# Patient Record
Sex: MALE | Race: WHITE
[De-identification: names, ages, dates, MRNs, and addresses within clinical notes are randomized per-mention and may not be internally consistent; named-entity substitution may affect disease eponyms.]

---

## 2017-04-11 ENCOUNTER — HOSPITAL ENCOUNTER (OUTPATIENT)
Age: 82
Discharge: ANOTHER HOSPITAL | End: 2017-04-11
Payer: MEDICARE

## 2017-04-11 ENCOUNTER — HOSPITAL ENCOUNTER (EMERGENCY)
Age: 82
Discharge: HOME | End: 2017-04-11
Payer: MEDICARE

## 2017-04-11 DIAGNOSIS — G20: ICD-10-CM

## 2017-04-11 DIAGNOSIS — J32.0: ICD-10-CM

## 2017-04-11 DIAGNOSIS — J18.9: Primary | ICD-10-CM

## 2017-04-11 DIAGNOSIS — I10: ICD-10-CM

## 2017-04-11 DIAGNOSIS — R53.1: Primary | ICD-10-CM

## 2017-04-11 DIAGNOSIS — R01.1: ICD-10-CM

## 2017-04-11 DIAGNOSIS — Z86.73: ICD-10-CM

## 2017-04-11 DIAGNOSIS — Z79.82: ICD-10-CM

## 2017-04-11 PROCEDURE — 71020: CPT

## 2017-04-11 PROCEDURE — 99284 EMERGENCY DEPT VISIT MOD MDM: CPT

## 2017-04-11 PROCEDURE — 80053 COMPREHEN METABOLIC PANEL: CPT

## 2017-04-11 PROCEDURE — 36415 COLL VENOUS BLD VENIPUNCTURE: CPT

## 2017-04-11 PROCEDURE — 85025 COMPLETE CBC W/AUTO DIFF WBC: CPT

## 2017-04-11 PROCEDURE — 81003 URINALYSIS AUTO W/O SCOPE: CPT

## 2017-04-11 PROCEDURE — 70450 CT HEAD/BRAIN W/O DYE: CPT

## 2017-04-11 PROCEDURE — 83690 ASSAY OF LIPASE: CPT

## 2018-03-13 ENCOUNTER — HOSPITAL ENCOUNTER (OUTPATIENT)
Dept: HOSPITAL 76 - EMS | Age: 83
Discharge: ANOTHER HOSPITAL | End: 2018-03-13
Attending: SURGERY
Payer: MEDICARE

## 2018-03-13 ENCOUNTER — HOSPITAL ENCOUNTER (EMERGENCY)
Dept: HOSPITAL 76 - ED | Age: 83
Discharge: HOME | End: 2018-03-13
Payer: MEDICARE

## 2018-03-13 VITALS — SYSTOLIC BLOOD PRESSURE: 151 MMHG | DIASTOLIC BLOOD PRESSURE: 78 MMHG

## 2018-03-13 DIAGNOSIS — R53.1: ICD-10-CM

## 2018-03-13 DIAGNOSIS — J18.9: ICD-10-CM

## 2018-03-13 DIAGNOSIS — Z79.82: ICD-10-CM

## 2018-03-13 DIAGNOSIS — R94.31: ICD-10-CM

## 2018-03-13 DIAGNOSIS — Z86.73: ICD-10-CM

## 2018-03-13 DIAGNOSIS — R05: Primary | ICD-10-CM

## 2018-03-13 DIAGNOSIS — R53.1: Primary | ICD-10-CM

## 2018-03-13 LAB
ALBUMIN DIAFP-MCNC: 4.1 G/DL (ref 3.2–5.5)
ALBUMIN/GLOB SERPL: 1.3 {RATIO} (ref 1–2.2)
ALP SERPL-CCNC: 53 IU/L (ref 42–121)
ALT SERPL W P-5'-P-CCNC: < 10 IU/L (ref 10–60)
ANION GAP SERPL CALCULATED.4IONS-SCNC: 9 MMOL/L (ref 6–13)
AST SERPL W P-5'-P-CCNC: 20 IU/L (ref 10–42)
BASOPHILS NFR BLD AUTO: 0.1 10^3/UL (ref 0–0.1)
BASOPHILS NFR BLD AUTO: 0.4 %
BILIRUB BLD-MCNC: 1.3 MG/DL (ref 0.2–1)
BUN SERPL-MCNC: 21 MG/DL (ref 6–20)
CALCIUM UR-MCNC: 9.1 MG/DL (ref 8.5–10.3)
CHLORIDE SERPL-SCNC: 102 MMOL/L (ref 101–111)
CLARITY UR REFRACT.AUTO: CLEAR
CO2 SERPL-SCNC: 26 MMOL/L (ref 21–32)
CREAT SERPLBLD-SCNC: 0.9 MG/DL (ref 0.6–1.2)
EOSINOPHIL # BLD AUTO: 0 10^3/UL (ref 0–0.7)
EOSINOPHIL NFR BLD AUTO: 0.1 %
ERYTHROCYTE [DISTWIDTH] IN BLOOD BY AUTOMATED COUNT: 13.3 % (ref 12–15)
GFRSERPLBLD MDRD-ARVRAT: 80 ML/MIN/{1.73_M2} (ref 89–?)
GLOBULIN SER-MCNC: 3.1 G/DL (ref 2.1–4.2)
GLUCOSE SERPL-MCNC: 110 MG/DL (ref 70–100)
GLUCOSE UR QL STRIP.AUTO: NEGATIVE MG/DL
HGB UR QL STRIP: 13.3 G/DL (ref 14–18)
KETONES UR QL STRIP.AUTO: (no result) MG/DL
LIPASE SERPL-CCNC: 12 U/L (ref 22–51)
LYMPHOCYTES # SPEC AUTO: 0.4 10^3/UL (ref 1.5–3.5)
LYMPHOCYTES NFR BLD AUTO: 2.5 %
MAGNESIUM SERPL-MCNC: 1.8 MG/DL (ref 1.7–2.8)
MCH RBC QN AUTO: 29.6 PG (ref 27–31)
MCHC RBC AUTO-ENTMCNC: 33.6 G/DL (ref 32–36)
MCV RBC AUTO: 88.2 FL (ref 80–94)
MONOCYTES # BLD AUTO: 0.8 10^3/UL (ref 0–1)
MONOCYTES NFR BLD AUTO: 4.7 %
NEUTROPHILS # BLD AUTO: 16.5 10^3/UL (ref 1.5–6.6)
NEUTROPHILS # SNV AUTO: 17.8 X10^3/UL (ref 4.8–10.8)
NEUTROPHILS NFR BLD AUTO: 92.3 %
NITRITE UR QL STRIP.AUTO: NEGATIVE
PDW BLD AUTO: 9 FL (ref 7.4–11.4)
PH UR STRIP.AUTO: 6.5 PH (ref 5–7.5)
PLATELET # BLD: 141 10^3/UL (ref 130–450)
PROT SPEC-MCNC: 7.2 G/DL (ref 6.7–8.2)
PROT UR STRIP.AUTO-MCNC: NEGATIVE MG/DL
RBC # UR STRIP.AUTO: NEGATIVE /UL
RBC MAR: 4.47 10^6/UL (ref 4.7–6.1)
SODIUM SERPLBLD-SCNC: 137 MMOL/L (ref 135–145)
SP GR UR STRIP.AUTO: 1.02 (ref 1–1.03)
UROBILINOGEN UR QL STRIP.AUTO: (no result) E.U./DL
UROBILINOGEN UR STRIP.AUTO-MCNC: NEGATIVE MG/DL

## 2018-03-13 PROCEDURE — 85025 COMPLETE CBC W/AUTO DIFF WBC: CPT

## 2018-03-13 PROCEDURE — 87086 URINE CULTURE/COLONY COUNT: CPT

## 2018-03-13 PROCEDURE — 71046 X-RAY EXAM CHEST 2 VIEWS: CPT

## 2018-03-13 PROCEDURE — 83690 ASSAY OF LIPASE: CPT

## 2018-03-13 PROCEDURE — 80053 COMPREHEN METABOLIC PANEL: CPT

## 2018-03-13 PROCEDURE — 83605 ASSAY OF LACTIC ACID: CPT

## 2018-03-13 PROCEDURE — 93005 ELECTROCARDIOGRAM TRACING: CPT

## 2018-03-13 PROCEDURE — 96361 HYDRATE IV INFUSION ADD-ON: CPT

## 2018-03-13 PROCEDURE — 81003 URINALYSIS AUTO W/O SCOPE: CPT

## 2018-03-13 PROCEDURE — 99284 EMERGENCY DEPT VISIT MOD MDM: CPT

## 2018-03-13 PROCEDURE — 83880 ASSAY OF NATRIURETIC PEPTIDE: CPT

## 2018-03-13 PROCEDURE — 81001 URINALYSIS AUTO W/SCOPE: CPT

## 2018-03-13 PROCEDURE — 36415 COLL VENOUS BLD VENIPUNCTURE: CPT

## 2018-03-13 PROCEDURE — 96374 THER/PROPH/DIAG INJ IV PUSH: CPT

## 2018-03-13 PROCEDURE — 84484 ASSAY OF TROPONIN QUANT: CPT

## 2018-03-13 PROCEDURE — 83735 ASSAY OF MAGNESIUM: CPT

## 2018-03-13 NOTE — ED PHYSICIAN DOCUMENTATION
PD HPI URI





- Stated complaint


Stated Complaint: WEAKNESS





- Chief complaint


Chief Complaint: General





- History obtained from


History obtained from: Patient, Family (wife)





- History of Present Illness


Timing - onset: Today


Timing duration: Hours


Timing details: Gradual onset (he felt generally weaker when awoke today and 

unable to get himself out of bed. He says this will happen sometimes and then 

improves over an hour or two. Not feeling better through morning today. Has had 

long term cough which is increased some for 1-2 weeks. No fevers. No chest pain.

)


Associated symptoms: Chills, Productive cough (clear sputum, long term cough).  

No: Fever, Nasal congestion, Rhinorrhea, Swollen nodes, Chest pain, Dyspnea, NVD

, Bilateral edema


Contributing factors: No: Sick contact, Travel, COPD / asthma


Improves by: Rest


Similar symptoms before: No diagnosis


Recently seen: Not recently seen





Review of Systems


Constitutional: reports: Chills, Fatigue.  denies: Fever, Myalgias


Nose: denies: Rhinorrhea / runny nose, Congestion


Throat: denies: Sore throat


Cardiac: denies: Chest pain / pressure, Palpitations, Pedal edema, Calf pain


Respiratory: reports: Cough.  denies: Dyspnea, Wheezing


GI: denies: Abdominal Pain, Nausea, Vomiting, Diarrhea


: denies: Dysuria, Frequency


Skin: denies: Rash, Lesions


Neurologic: reports: Generalized weakness.  denies: Focal weakness, Numbness, 

Near syncope, Altered mental status, Headache


Endocrine: reports: Weight loss.  denies: Weight gain


Immunocompromised: denies: Immunocompromised





PD PAST MEDICAL HISTORY





- Past Medical History


Cardiovascular: Hypertension


Respiratory: None


Neuro: CVA


Endocrine/Autoimmune: None


GI: None


: Benign prostate hypertrophy, Incontinence


HEENT: None


Psych: None


Musculoskeletal: Osteoarthritis, Chronic back pain


Derm: None





- Past Surgical History


Past Surgical History: No





- Present Medications


Home Medications: 


 Ambulatory Orders











 Medication  Instructions  Recorded  Confirmed


 


Aspirin EC [Ecotrin] 325 mg PO DAILY 07/07/15 04/11/17


 


Azithromycin [Zithromax] 250 mg PO DAILY #4 tablet 04/11/17 


 


Metoprolol Tartrate [Metoprolol 25 mg PO DAILY 04/11/17 04/11/17





Tartrate]   


 


Tamsulosin [Flomax] 0.4 mg PO DAILY 04/11/17 04/11/17


 


Dexamethasone [Decadron] 4 mg PO DAILY #5 tablet 03/13/18 


 


Doxycycline Monohydrate 100 mg PO BID #14 tablet 03/13/18 














- Allergies


Allergies/Adverse Reactions: 


 Allergies











Allergy/AdvReac Type Severity Reaction Status Date / Time


 


No Known Drug Allergies Allergy   Verified 04/11/17 19:12














- Living Situation


Living Situation: reports: With spouse/s.o.


Living Arrangement: reports: At home





- Social History


Does the pt smoke?: No


Smoking Status: Never smoker


Does the pt drink ETOH?: No


Does the pt have substance abuse?: No





- Family History


Family history: reports: Non contributory





- Immunizations


Immunizations are current?: Yes





- POLST


Patient has POLST: No





PD ED PE NORMAL





- Vitals


Vital signs reviewed: Yes





- General


General: Alert and oriented X 3, No acute distress, Well developed/nourished, 

Other (generally frail appearance but bright and alert)





- HEENT


HEENT: Ears normal, Moist mucous membranes, Pharynx benign





- Neck


Neck: Supple, no meningeal sign, No adenopathy





- Cardiac


Cardiac: RRR, Other (murmur noted left chest, does not go to carotids. )





- Respiratory


Respiratory: No: Clear bilaterally (slight congestion/wheezing noted left side. 

Wet cough intermittently. )





- Abdomen


Abdomen: Soft, Non tender





- Back


Back: No CVA TTP





- Derm


Derm: Normal color, Warm and dry





- Extremities


Extremities: No tenderness to palpate, Normal ROM s pain, No edema, No calf 

tenderness / cord





- Neuro


Neuro: Alert and oriented X 3, CNs 2-12 intact, No motor deficit (mild 

shakiness noted with arms raising), No sensory deficit, Normal speech


Eye Opening: Spontaneous


Motor: Obeys Commands


Verbal: Oriented


GCS Score: 15





Results





- Vitals


Vitals: 


 Vital Signs - 24 hr











  03/13/18 03/13/18 03/13/18





  11:25 13:05 15:50


 


Temperature 37.5 C  


 


Heart Rate 95 92 92


 


Respiratory 20 14 20





Rate   


 


Blood Pressure 166/74 H 151/82 H 151/78 H


 


O2 Saturation 100 96 95








 Oxygen











O2 Source                      Room air

















- Labs


Labs: 


 Laboratory Tests











  03/13/18 03/13/18 03/13/18





  11:38 11:38 11:38


 


WBC  17.8 H  


 


RBC  4.47 L  


 


Hgb  13.3 L  


 


Hct  39.4 L  


 


MCV  88.2  


 


MCH  29.6  


 


MCHC  33.6  


 


RDW  13.3  


 


Plt Count  141  


 


MPV  9.0  


 


Neut #  16.5 H  


 


Lymph #  0.4 L  


 


Mono #  0.8  


 


Eos #  0.0  


 


Baso #  0.1  


 


Absolute Nucleated RBC  0.00  


 


Nucleated RBC %  0.0  


 


Sodium   137 


 


Potassium   4.1 


 


Chloride   102 


 


Carbon Dioxide   26 


 


Anion Gap   9.0 


 


BUN   21 H 


 


Creatinine   0.9 


 


Estimated GFR (MDRD)   80 L 


 


Glucose   110 H 


 


Lactic Acid   


 


Calcium   9.1 


 


Magnesium   1.8 


 


Total Bilirubin   1.3 H 


 


AST   20 


 


ALT   < 10 L 


 


Alkaline Phosphatase   53 


 


Troponin I    0.04


 


B-Natriuretic Peptide   


 


Total Protein   7.2 


 


Albumin   4.1 


 


Globulin   3.1 


 


Albumin/Globulin Ratio   1.3 


 


Lipase   12 L 


 


Urine Color   


 


Urine Clarity   


 


Urine pH   


 


Ur Specific Gravity   


 


Urine Protein   


 


Urine Glucose (UA)   


 


Urine Ketones   


 


Urine Occult Blood   


 


Urine Nitrite   


 


Urine Bilirubin   


 


Urine Urobilinogen   


 


Ur Leukocyte Esterase   


 


Ur Microscopic Review   


 


Urine Culture Comments   














  03/13/18 03/13/18 03/13/18





  11:38 11:38 11:38


 


WBC   


 


RBC   


 


Hgb   


 


Hct   


 


MCV   


 


MCH   


 


MCHC   


 


RDW   


 


Plt Count   


 


MPV   


 


Neut #   


 


Lymph #   


 


Mono #   


 


Eos #   


 


Baso #   


 


Absolute Nucleated RBC   


 


Nucleated RBC %   


 


Sodium   


 


Potassium   


 


Chloride   


 


Carbon Dioxide   


 


Anion Gap   


 


BUN   


 


Creatinine   


 


Estimated GFR (MDRD)   


 


Glucose   


 


Lactic Acid   1.0 


 


Calcium   


 


Magnesium   


 


Total Bilirubin   


 


AST   


 


ALT   


 


Alkaline Phosphatase   


 


Troponin I   


 


B-Natriuretic Peptide  211 H  


 


Total Protein   


 


Albumin   


 


Globulin   


 


Albumin/Globulin Ratio   


 


Lipase   


 


Urine Color    YELLOW


 


Urine Clarity    CLEAR


 


Urine pH    6.5


 


Ur Specific Gravity    1.020


 


Urine Protein    NEGATIVE


 


Urine Glucose (UA)    NEGATIVE


 


Urine Ketones    TRACE


 


Urine Occult Blood    NEGATIVE


 


Urine Nitrite    NEGATIVE


 


Urine Bilirubin    NEGATIVE


 


Urine Urobilinogen    0.2 (NORMAL)


 


Ur Leukocyte Esterase    NEGATIVE


 


Ur Microscopic Review    NOT INDICATED


 


Urine Culture Comments    NOT INDICATED














- Rads (name of study)


  ** chest


Radiology: Prelim report reviewed (lingular infiltrate c/w mild pneumonia)





PD MEDICAL DECISION MAKING





- ED course


Complexity details: re-evaluated patient (he is able to ambulate with walker in 

ED without needing assistance. Presume he can be at home and he feels able to 

do usual mobility at this time. Sats are good, elevated WBC bu vitals otherwise 

good. Does not meet hospitalization criteria. ), considered differential, d/w 

patient, d/w family (wife)





Departure





- Departure


Disposition: 01 Home, Self Care


Clinical Impression: 


 Cough, Weakness





Pneumonia


Qualifiers:


 Pneumonia type: due to unspecified organism Laterality: left Lung location: 

unspecified part of lung Qualified Code(s): J18.9 - Pneumonia, unspecified 

organism





Condition: Stable


Record reviewed to determine appropriate education?: Yes


Instructions:  ED Pneumonia Adult


Follow-Up: 


Scott Chery MD [Primary Care Provider] - 


Prescriptions: 


Dexamethasone [Decadron] 4 mg PO DAILY #5 tablet


Doxycycline Monohydrate 100 mg PO BID #14 tablet


Comments: 


Drink lots of fluids.  Regular medications.  Your x-ray does show a possible 

small pneumonia on one side.  Your white count is a little bit elevated to go 

along with infection.  This could be making you feel weaker.  Will give her 

some antibiotics and an anti-inflammatory doxycycline and Decadron as directed.

  Recheck if worsening symptoms or other problems develop.


Discharge Date/Time: 03/13/18 18:08

## 2018-03-13 NOTE — XRAY REPORT
EXAM:

CHEST RADIOGRAPHY

 

EXAM DATE: 3/13/2018 12:46 PM.

 

CLINICAL HISTORY: Cough and weakness.

 

COMPARISON: 04/11/2017.

 

TECHNIQUE: 2 views.

 

FINDINGS: 

Lungs/Pleura: Mild lingular/left lower lobe opacity may reflect atelectasis or infiltrate. This is si
milar to prior. No new pulmonary opacity. No pleural effusion or pneumothorax are evident.

 

Mediastinum: Heart and mediastinal contours are unremarkable.

 

Other: None.

 

IMPRESSION: Mild lingular/left lower lobe opacity may reflect atelectasis or infiltrate.

 

[facility]

Referring Provider Line: [phone]

 

SITE ID: [id number]

## 2018-05-01 ENCOUNTER — HOSPITAL ENCOUNTER (OUTPATIENT)
Dept: HOSPITAL 76 - EMS | Age: 83
End: 2018-05-01
Attending: SURGERY
Payer: MEDICARE
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
## 2018-05-01 DIAGNOSIS — S01.01XA: Primary | ICD-10-CM

## 2018-07-18 ENCOUNTER — HOSPITAL ENCOUNTER (OUTPATIENT)
Dept: HOSPITAL 76 - DI | Age: 83
Discharge: HOME | End: 2018-07-18
Attending: FAMILY MEDICINE
Payer: MEDICARE

## 2018-07-18 DIAGNOSIS — R13.10: Primary | ICD-10-CM

## 2018-07-18 PROCEDURE — 92611 MOTION FLUOROSCOPY/SWALLOW: CPT

## 2018-07-18 PROCEDURE — 74230 X-RAY XM SWLNG FUNCJ C+: CPT

## 2018-07-18 NOTE — XRAY REPORT
Procedure Date:  07/18/2018   

Accession Number:  [id number] / [id number]                    

Procedure:  FL  - Modified Barium Swallow W/SP CPT Code:  

 

FULL RESULT:

 

 

EXAM: Modified Barium Swallow W/SP

 

DATE: 7/18/2018 1:53 PM

 

CLINICAL HISTORY: DYSPHAGIA, MOVEMENT DISORDER

 

COMPARISON: None.

 

TECHNIQUE: Under the direction of speech pathology, patient swallowed 

various consistencies of barium under lateral fluoroscopic observation of 

the neck.

 

Fluoroscopic exposure time: 3 minutes 46 seconds.

Number of fluoroscopic images: 0. 5 series of Cine fluoroscopy recorded.

 

FINDINGS:

 

Airway Protection: Hesitant epiglottic motion. No episodes of tracheal 

penetration or aspiration with all consistencies of barium.

 

Other: None. Please also refer to full report from Speech Pathology.

 

IMPRESSION:

Hesitation without aspiration or penetration.

 

[facility]

## 2018-08-01 ENCOUNTER — HOSPITAL ENCOUNTER (OUTPATIENT)
Dept: HOSPITAL 76 - LAB.WCP | Age: 83
End: 2018-08-01
Attending: FAMILY MEDICINE
Payer: MEDICARE

## 2018-08-01 DIAGNOSIS — I10: Primary | ICD-10-CM

## 2018-08-01 DIAGNOSIS — E78.5: ICD-10-CM

## 2018-08-01 DIAGNOSIS — I25.10: ICD-10-CM

## 2018-08-01 DIAGNOSIS — I63.9: ICD-10-CM

## 2018-08-01 LAB
ALBUMIN DIAFP-MCNC: 3.9 G/DL (ref 3.2–5.5)
ALBUMIN/GLOB SERPL: 1 {RATIO} (ref 1–2.2)
ALP SERPL-CCNC: 61 IU/L (ref 42–121)
ALT SERPL W P-5'-P-CCNC: 10 IU/L (ref 10–60)
ANION GAP SERPL CALCULATED.4IONS-SCNC: 6 MMOL/L (ref 6–13)
AST SERPL W P-5'-P-CCNC: 15 IU/L (ref 10–42)
BASOPHILS NFR BLD AUTO: 0.1 10^3/UL (ref 0–0.1)
BASOPHILS NFR BLD AUTO: 0.9 %
BILIRUB BLD-MCNC: 1.3 MG/DL (ref 0.2–1)
BUN SERPL-MCNC: 16 MG/DL (ref 6–20)
CALCIUM UR-MCNC: 9.2 MG/DL (ref 8.5–10.3)
CHLORIDE SERPL-SCNC: 101 MMOL/L (ref 101–111)
CHOLEST SERPL-MCNC: 191 MG/DL
CO2 SERPL-SCNC: 30 MMOL/L (ref 21–32)
CREAT SERPLBLD-SCNC: 1 MG/DL (ref 0.6–1.2)
EOSINOPHIL # BLD AUTO: 0.2 10^3/UL (ref 0–0.7)
EOSINOPHIL NFR BLD AUTO: 2.1 %
ERYTHROCYTE [DISTWIDTH] IN BLOOD BY AUTOMATED COUNT: 13.3 % (ref 12–15)
GFRSERPLBLD MDRD-ARVRAT: 71 ML/MIN/{1.73_M2} (ref 89–?)
GLOBULIN SER-MCNC: 3.8 G/DL (ref 2.1–4.2)
GLUCOSE SERPL-MCNC: 93 MG/DL (ref 70–100)
HDLC SERPL-MCNC: 47 MG/DL
HDLC SERPL: 4.1 {RATIO} (ref ?–5)
HGB UR QL STRIP: 13.2 G/DL (ref 14–18)
LDLC SERPL CALC-MCNC: 126 MG/DL
LDLC/HDLC SERPL: 2.7 {RATIO} (ref ?–3.6)
LYMPHOCYTES # SPEC AUTO: 1.3 10^3/UL (ref 1.5–3.5)
LYMPHOCYTES NFR BLD AUTO: 14.1 %
MCH RBC QN AUTO: 30.2 PG (ref 27–31)
MCHC RBC AUTO-ENTMCNC: 32.4 G/DL (ref 32–36)
MCV RBC AUTO: 93.4 FL (ref 80–94)
MONOCYTES # BLD AUTO: 0.8 10^3/UL (ref 0–1)
MONOCYTES NFR BLD AUTO: 8.8 %
NEUTROPHILS # BLD AUTO: 6.7 10^3/UL (ref 1.5–6.6)
NEUTROPHILS # SNV AUTO: 9.1 X10^3/UL (ref 4.8–10.8)
NEUTROPHILS NFR BLD AUTO: 74.1 %
PDW BLD AUTO: 9.3 FL (ref 7.4–11.4)
PLATELET # BLD: 176 10^3/UL (ref 130–450)
PROT SPEC-MCNC: 7.7 G/DL (ref 6.7–8.2)
RBC MAR: 4.36 10^6/UL (ref 4.7–6.1)
SODIUM SERPLBLD-SCNC: 137 MMOL/L (ref 135–145)
VLDLC SERPL-SCNC: 18 MG/DL

## 2018-08-01 PROCEDURE — 84443 ASSAY THYROID STIM HORMONE: CPT

## 2018-08-01 PROCEDURE — 80061 LIPID PANEL: CPT

## 2018-08-01 PROCEDURE — 36415 COLL VENOUS BLD VENIPUNCTURE: CPT

## 2018-08-01 PROCEDURE — 83721 ASSAY OF BLOOD LIPOPROTEIN: CPT

## 2018-08-01 PROCEDURE — 80053 COMPREHEN METABOLIC PANEL: CPT

## 2018-08-01 PROCEDURE — 85025 COMPLETE CBC W/AUTO DIFF WBC: CPT

## 2018-10-12 ENCOUNTER — HOSPITAL ENCOUNTER (OUTPATIENT)
Dept: HOSPITAL 76 - EMS | Age: 83
Discharge: ANOTHER HOSPITAL | End: 2018-10-12
Attending: SURGERY
Payer: MEDICARE

## 2018-10-12 ENCOUNTER — HOSPITAL ENCOUNTER (EMERGENCY)
Dept: HOSPITAL 76 - ED | Age: 83
Discharge: HOME | End: 2018-10-12
Payer: MEDICARE

## 2018-10-12 VITALS — SYSTOLIC BLOOD PRESSURE: 176 MMHG | DIASTOLIC BLOOD PRESSURE: 104 MMHG

## 2018-10-12 DIAGNOSIS — S42.212A: Primary | ICD-10-CM
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
## 2018-10-12 DIAGNOSIS — M25.512: Primary | ICD-10-CM
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
## 2018-10-12 DIAGNOSIS — Z79.82: ICD-10-CM

## 2018-10-12 DIAGNOSIS — I10: ICD-10-CM
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
## 2018-10-12 PROCEDURE — 99283 EMERGENCY DEPT VISIT LOW MDM: CPT

## 2018-10-12 PROCEDURE — 73030 X-RAY EXAM OF SHOULDER: CPT

## 2018-10-12 NOTE — ED PHYSICIAN DOCUMENTATION
PD HPI UPPER EXT INJURY





- Stated complaint


Stated Complaint: L SHOULDER PAIN / GLF 2 DAYS AGO





- Chief complaint


Chief Complaint: Trauma Ext





- History obtained from


History obtained from: Patient





- History of Present Illness


Location: Left, Clavicle, Shoulder


Type of injury: Fall


Where injury occurred: Home


Timing - onset: Today


Timing - details: Abrupt onset


Worsened by: Moving, Palpating


Associated symptoms: No: Weakness, Numbness, Swelling


Contributing factors: No: Anticoagulated


Similar symptoms before: Has not had sx before


Recently seen: Not recently seen





Review of Systems


Cardiac: denies: Chest pain / pressure


GI: denies: Abdominal Pain


Skin: denies: Abrasion (s), Laceration (s)


Musculoskeletal: reports: Joint pain (right shoulder)


Neurologic: denies: Focal weakness, Numbness, Altered mental status, Headache, 

Head injury





PD PAST MEDICAL HISTORY





- Past Medical History


Past Medical History: Yes


Cardiovascular: Hypertension


Respiratory: None


Neuro: Parkinson's


Endocrine/Autoimmune: None


GI: None


: Benign prostate hypertrophy, Incontinence


HEENT: None


Psych: None


Musculoskeletal: Osteoarthritis, Chronic back pain


Derm: None





- Past Surgical History


Past Surgical History: Yes


HEENT: Cataracts





- Present Medications


Home Medications: 


                                Ambulatory Orders











 Medication  Instructions  Recorded  Confirmed


 


Aspirin EC [Ecotrin] 325 mg PO DAILY 07/07/15 04/11/17


 


Azithromycin [Zithromax] 250 mg PO DAILY #4 tablet 04/11/17 


 


Metoprolol Tartrate 25 mg PO DAILY 04/11/17 04/11/17


 


Tamsulosin [Flomax] 0.4 mg PO DAILY 04/11/17 04/11/17


 


Dexamethasone [Decadron] 4 mg PO DAILY #5 tablet 03/13/18 


 


Doxycycline Monohydrate 100 mg PO BID #14 tablet 03/13/18 














- Allergies


Allergies/Adverse Reactions: 


                                    Allergies











Allergy/AdvReac Type Severity Reaction Status Date / Time


 


No Known Drug Allergies Allergy   Verified 04/11/17 19:12














- Social History


Does the pt smoke?: No


Smoking Status: Never smoker


Does the pt drink ETOH?: No


Does the pt have substance abuse?: No





- Immunizations


Immunizations are current?: No





- POLST


Patient has POLST: No





PD ED PE NORMAL





- Vitals


Vital signs reviewed: Yes





- General


General: Alert and oriented X 3, Well developed/nourished, Other (not much pain 

if arm held still. )





- HEENT


HEENT: Atraumatic





- Neck


Neck: Supple, no meningeal sign, No bony TTP, No adenopathy





- Cardiac


Cardiac: RRR, No murmur





- Respiratory


Respiratory: Clear bilaterally





- Abdomen


Abdomen: Soft, Non tender





- Derm


Derm: Normal color, Warm and dry





- Extremities


Extremities: Other (right shoulder tender at proximal humerus. Clavicle not 

tender. No dislocation. )





- Neuro


Neuro: Alert and oriented X 3, No motor deficit, No sensory deficit, Normal 

speech





Results





- Vitals


Vitals: 


                               Vital Signs - 24 hr











  10/12/18 10/12/18





  12:09 13:00


 


Temperature 36.1 C L 


 


Heart Rate 99 91


 


Respiratory 16 





Rate  


 


Blood Pressure 140/72 H 130/80


 


O2 Saturation 98 97








                                     Oxygen











O2 Source                      Room air

















- Rads (name of study)


  ** right shoulder


Radiology: Prelim report reviewed (impacted humeral neck. )





PD MEDICAL DECISION MAKING





- ED course


Complexity details: reviewed results, re-evaluated patient (he is able to 

ambulate and get around one handed (does not use walker). ), considered 

differential, d/w patient





Departure





- Departure


Disposition: 01 Home, Self Care


Clinical Impression: 


Fall from slip, trip, or stumble


Qualifiers:


 Encounter type: initial encounter Qualified Code(s): W01.0XXA - Fall on same 

level from slipping, tripping and stumbling without subsequent striking against 

object, initial encounter





Fracture of neck of humerus


Qualifiers:


 Encounter type: initial encounter Fracture type: closed Laterality: left 

Qualified Code(s): S42.212A - Unspecified displaced fracture of surgical neck of

 left humerus, initial encounter for closed fracture





Condition: Stable


Record reviewed to determine appropriate education?: Yes


Instructions:  ED Fx Shoulder


Follow-Up: 


Scott Chery MD [Primary Care Provider] - 


[facility] [Provider Group]


Comments: 


Use the sling for the next 4-6 weeks while the fracture is healing.  He can have

it off temporarily for changing close and washing etc.  Use it most of the time 

to help support the shoulder.  Rest and sleep in the best position of comfort.  

Use your current pain medications use that you have at home.  Add Tylenol if 

needed for pains.  Follow-up with your primary care or orthopedics next week and

call for an appointment to ensure its starting to heal up okay.  He will have 

some swelling and likely bruising develop in the arm and even down towards the 

elbow area over the next few days possibly.


Discharge Date/Time: 10/12/18 16:27

## 2018-10-12 NOTE — XRAY REPORT
Reason:  FALL / SHOULDER PAIN

Procedure Date:  10/12/2018   

Accession Number:  [id number] / [id number]                    

Procedure:  XR  - Shoulder 2 View LT CPT Code:  

 

FULL RESULT:

 

 

EXAM:

LEFT SHOULDER RADIOGRAPHY

 

EXAM DATE: 10/12/2018 02:52 PM.

 

CLINICAL HISTORY: FALL / SHOULDER PAIN.

 

COMPARISON: None.

 

TECHNIQUE: 2 views.

 

FINDINGS:

Bones: Osteopenia. Impacted transverse fracture of humeral neck with apex 

anterior angulation, but nearly complete apposition of major fragments. 

Old rib fractures.

 

Joints: Moderate degenerative changes. Anatomic alignment.

 

Soft tissues: Clear visualized lung.

IMPRESSION: Impacted humeral neck fracture.

 

[facility]

## 2018-10-26 ENCOUNTER — HOSPITAL ENCOUNTER (OUTPATIENT)
Dept: HOSPITAL 76 - LAB.R | Age: 83
Discharge: HOME | End: 2018-10-26
Attending: SKILLED NURSING FACILITY
Payer: MEDICARE

## 2018-10-26 DIAGNOSIS — I10: Primary | ICD-10-CM

## 2018-10-26 LAB
ANION GAP SERPL CALCULATED.4IONS-SCNC: 8 MMOL/L (ref 6–13)
BUN SERPL-MCNC: 35 MG/DL (ref 6–20)
CALCIUM UR-MCNC: 8.5 MG/DL (ref 8.5–10.3)
CHLORIDE SERPL-SCNC: 100 MMOL/L (ref 101–111)
CO2 SERPL-SCNC: 27 MMOL/L (ref 21–32)
CREAT SERPLBLD-SCNC: 1 MG/DL (ref 0.6–1.2)
GFRSERPLBLD MDRD-ARVRAT: 71 ML/MIN/{1.73_M2} (ref 89–?)
GLUCOSE SERPL-MCNC: 136 MG/DL (ref 70–100)
SODIUM SERPLBLD-SCNC: 135 MMOL/L (ref 135–145)

## 2018-10-26 PROCEDURE — 80048 BASIC METABOLIC PNL TOTAL CA: CPT

## 2018-11-13 ENCOUNTER — HOSPITAL ENCOUNTER (OUTPATIENT)
Dept: HOSPITAL 76 - PC | Age: 83
Discharge: HOME | End: 2018-11-13
Attending: NURSE PRACTITIONER
Payer: MEDICARE

## 2018-11-13 DIAGNOSIS — I48.91: ICD-10-CM

## 2018-11-13 DIAGNOSIS — R53.1: ICD-10-CM

## 2018-11-13 DIAGNOSIS — M54.9: ICD-10-CM

## 2018-11-13 DIAGNOSIS — Z79.82: ICD-10-CM

## 2018-11-13 DIAGNOSIS — S42.292A: ICD-10-CM

## 2018-11-13 DIAGNOSIS — G89.29: ICD-10-CM

## 2018-11-13 DIAGNOSIS — R62.7: ICD-10-CM

## 2018-11-13 DIAGNOSIS — I10: ICD-10-CM

## 2018-11-13 DIAGNOSIS — Z91.81: ICD-10-CM

## 2018-11-13 DIAGNOSIS — Z86.73: ICD-10-CM

## 2018-11-13 DIAGNOSIS — G20: ICD-10-CM

## 2018-11-13 DIAGNOSIS — Z66: ICD-10-CM

## 2018-11-13 DIAGNOSIS — R13.10: ICD-10-CM

## 2018-11-13 DIAGNOSIS — Z51.5: Primary | ICD-10-CM

## 2018-11-13 PROCEDURE — 99306 1ST NF CARE HIGH MDM 50: CPT

## 2018-11-13 NOTE — CONSULTATION NOTE
Palliative Care Consultation





- Referral


Referring Provider: Dr Paulo Humphries     PCP - Dr Chery


Time of Visit: 11/13/2018.  13:15 - 14:30


Referral setting: Skilled Nursing Facility ([facility])


Referral Reason: Parkinson's disease; debility; advanced care planning





- Information Sources


Records reviewed: RN notes reviewed


History/Review of Systems obtained from: Patient, Family, Nursing, Other 

(Medical Director)


Exam limitations: No limitations





- History of Present Illness


Brief History of Present Illness: 





Thank you, Dr. Humphries, for asking the palliative care consult service to be 

involved in the care of your patient. I am asked to provide support regarding 

goal clarification and advanced care planning.





85-year-old male with Parkinson's disease and poor balance with history of 

frequent falls, with recent L humeral fracture, currently at [facility] 

for rehabilitation. 





Medical history: Parkinson's disease, initial diagnosis around 2013; HTN; new 

atrial fibrillation; normal LV function, EF 50-55% (10/20/18); severe mitral 

regurgitation; BPH with urinary retention; h/o paraphimosis; dysphagia; h/o CVA 

from patient, ~20 years ago, no functional deficits or residual effects; adult 

FTT, fracture of L humeral neck; h/o frequent falls.





-October 11, he fell, sustaining a L humeral neck fracture, was taken to  ED, 

and was released with a sling and ortho follow up.


-October 15, he was brought to [facility] ED by wife due to weakness, 

decreased activity, poor PO intake which started shortly after the the GLF on 

10/11. He was started on IVF and ceftriaxone.


-October 16 he developed new atrial fibrillation with RVR. His HR increased to 

126 and SBP dropped to the 80s. They had difficulty placing the Carter, but 

eventually succeeded after 5 tries. He was given IVF and IV metoprolol for the 

tachycardia.


-October 19 he was started on Bactrim DS BID for E coli UTI.


-Acute paraphimosis was noted on the 10/19, they transferred him to [facility], where Dr Lozada of Urology manually depressed the foreskin edema and was 

able to reduce the foreskin back over the glans. 


-Also severe MR was seen on TTE at [facility]. They discussed with wife 

and patient a referral to cardiology for consideration of valve repair or 

replacement, but he was very clear he was not interested in surgery.





-October 22 he was transferred to [facility] for rehabilitation and 

skilled nursing. This is where today's Palliative Care visit took place. His sp

lakeshia Coleman is also present.





-He participates well with therapy and is making progress, walking 440 steps 

today, without walker or cane due to LUE sling. 


-He is accompanied by PT with belt.


-Tomorrow is his first ortho visit since his L humerus fracture.





-The Carter catheter was removed about 2 weeks ago and he is maintaining adequate

urination.





-Facility reports he is losing weight, currently 118.8 lbs.  Admission weight at

[facility] was 60.6 kg (133 lb 9.6 oz), BMI 20.31.  His wife thinks he 

was 51kg at [place] (112.2 lbs), likely in error.





-He is clear that his intention and goal is that he wants to go home. His spouse

is supportive insofar as she says, "Where else could he go."  They do not have 

funds for assisted living or LTC. 


-She had many questions on what palliative care service costs and what it does, 

and what exactly will take place when patient is discharged back home, as far as

equipment, when it will happen and where. 


-In addition to Palliative Care, [facility] SW and therapy will follow up and work 

with her as his discharge date grows closer. SW can hand off to Palliative Care 

SW as needed at discharge.








Medical/Surgical History





- Past Medical History


Cardiovascular: reports: Hypertension


Respiratory: reports: None


Neuro: Parkinson's


Neuro: reports: CVA


Endocrine/Autoimmune: reports: None


GI: reports: None


: reports: Benign prostate hypertrophy, Incontinence


HEENT: reports: None


Psych: reports: None


Musculoskeletal: reports: Osteoarthritis, Chronic back pain


Derm: reports: None





- Past Surgical History


HEENT: reports: Cataracts





- Substance History


Dependence: Experiences withdrawal or developed tolerances: NONE





Social History





- Living Situation


Living arrangement: At home


Living Situation: With spouse/s.o.


Support System: 





Patient lives with his wife at home. His wife is originally from [place]. Their 

son [place] the [place]. They report having no other family here or

support in the area. Up to the present they have not had outside caregiver 

support. Spouse reports they do not have financial means to place patient in a 

facility.





Dr Silviano Johnson, is the patient's neurologist[facility].








Family History





- Family History


Family History Comment/Other: 





Family history non contributory





Medications/Allergies





- Medications


Home Medications: 


                                Ambulatory Orders











 Medication  Instructions  Recorded  Confirmed


 


Metoprolol Tartrate 25 mg PO BID 04/11/17 04/11/17


 


Acetaminophen 650 mg PO Q6H PRN 11/13/18 11/13/18


 


Aspirin 81 mg PO DAILY 11/13/18 11/13/18


 


Atorvastatin Calcium 10 mg PO DAILY 11/13/18 11/13/18


 


Carbidopa/Levodopa [Carbidopa-Levo 1 tab PO TID 11/13/18 11/13/18





ER  Tab]   


 


Cholecalciferol (Vitamin D3) 1,000 unit PO DAILY 11/13/18 11/13/18





[Vitamin D3]   


 


Ferrous Gluconate 240 mg PO DAILY 11/13/18 11/13/18


 


HYDROcod/ACETAM 5/325 [[place] 5/325] 1 tab PO BID 11/13/18 11/13/18


 


House Bowel Program 1 ea PO DAILY PRN 11/13/18 


 


Polyethylene Glycol 3350 [Miralax] 17 gm PO DAILY PRN 11/13/18 11/13/18


 


Senna [Senokot] 8.6 mg PO DAILY 11/13/18 11/13/18














- Allergies


Allergies/Adverse Reactions: 


                                    Allergies











Allergy/AdvReac Type Severity Reaction Status Date / Time


 


No Known Drug Allergies Allergy   Verified 04/11/17 19:12














Review of Systems





- Constitutional


Constitutional: reports: Poor appetite, Weight loss (Weight loss per [facility]: 

118.8 lbs today; 119.2 lbs 11/6.  123.2 lbs 10/22.  Admission weight at 

[facility] was 60.6 kg (133 lb 9.6 oz), BMI 20.31. Spouse reports he 

weighed 51 kg at [facility] (112.2 lbs)).  denies: Fever, Chills





- Eyes


Eyes: reports: Corrective lenses





- Ears, Nose & Throat


Ears, Nose & Throat: reports: Hearing loss





- Cardiovascular


Cardiovascular: denies: Palpitations, Chest pain, Edema, Exertional dyspnea





- Respiratory


Respiratory: denies: SOB at rest





- Gastrointestinal


Gastrointestinal: reports: Constipation (bowel movements every other day)





- Genitourinary


Genitourinary: reports: Other (off the Carter catheter).  denies: Dysuria





- Musculoskeletal


Musculoskeletal: reports: Back pain (chronic, uses Norco 5/325 BID routinely), 

Limited range of motion, Assistive devices (usually uses walker, unable at 

present due to UE in sling, so uses W/C.  Can ambulate with belted assist from 

therapist.), Transfer issues (requires assistance)





- Neurological


Neurological: reports: General weakness, Abnormal gait, Other (Poor balance, 

frequent falls, dysphagia, but no cognitive deficits from Parkinson's)





- Psychiatric


Psychiatric: reports: Other (patient repeats that he wants to leave [facility] 

asap).  denies: Depression





- Other Findings


Other Findings: 





Limited ROS





Physical Exam





- Vital Signs


Temperature: 96.6 F


Pulse Rate: 59


O2 Saturation: 99


Blood Pressure: 112/62 (arm cuff)





- Physical Exam


General Appearance: positive: No acute distress, Alert


Eyes Bilateral: positive: No lid inflammation, Conjunctivae nml, No scleral 

icterus


ENT: positive: No signs of dehydration


Neck: positive: Trachea midline


Cardiovascular: positive: No murmur, No gallop, Irregularly irregular


Respiratory: positive: Chest non-tender, No respiratory distress, Diminished 

throughout


Abdomen: positive: Non-tender, Soft, Nml bowel sounds


Skin: positive: Pallor


Extremities: positive: No pedal edema


Neurologic/Psychiatric: positive: Oriented x3, Mood/affect nml, Slurred/abnml 

speech, Other (Resting tremor upper extremity.)





Palliative Care





- POLST


Patient has POLST: Yes


POLST Status: DNR, Comfort Measures


Pain: Pain unchanged (chronic back pain. No pain in arm, spouse reports he has 

"high tolerance" for pain)


Drowsiness/Sedation: None


Dyspnea: None


Anorexia: None (He reports good appetite), Weight loss


Constipation: Comment (His baseline is bowel movements every other day)


Performance Status: 





Ambulatory with belt support from therapist.


Previously used walker but now uses w/c.


Usually performs own ADLs, but now requires help because of LUE in sling








- Palliative Care


Discussion: 





Spouse was previously concerned about taking him home due to concern the level 

of his caregiving needs. Spouse, Virginia, is from [place] and expressed her dismay 

with the [employer] system and lack of affordable long-term health coverage

 in the [place]. 





She cannot assume additional care responsibility for the patient due to her age 

and physical limitations: she is "done" in the afternoons, without energy. 

Presently her biggest concern is what will happen when he gets home and what 

support will they be provided in terms of equipment and assessing their needs. I

 did refer her to SW at [facility] for specifics and coordinated with PT. 

Palliative Care can provide face-to-face-evaluations as needed when patient is 

discharged.





Their current plan is to have him go home and hire caregiver help. I provided 

his spouse the list of agencies and also recommended [employer] for 

private caregivers and care giver support groups. She said she was not 

interested in support groups.





According to [facility] notes by Lina Braxton MD, he had previously

 stated he was OK with intubation and respiratory support if needed. However, 

today patient repeatedly confirmed that he "wants nothing done," and so we 

updated his POLST to DNR and comfort care. His previous POLST, signed recently, 

was selective treatment. His spouse supports whatever he wants, states it is his

 decision to make, not hers.  I provided the original, new POLST to his spouse 

and placed a copy of the new POLST in his CoW chart.








Results





- Lab Results


Lab results reviewed: Yes


Lab and Imaging Results: 





Most recent from  10/26/18:


Na 135, K+ 4.6, Cl 100, BUN 35, Cr 1.0 GFR 71.





Most recent from [facility] 10/19/18:


WBC 10.5, Hgb 10.2, Hct 30.4, Plt 206,000, albumin 2.7, ALT less than 3, AST 16,

 alk phos 67, TSH 1.74








Impression and Recommendations





- Palliative Care


Impression: 





85-year-old male with Parkinson's disease and poor balance with history of 

frequent falls, with recent L humeral fracture, currently at [facility] 

for rehabilitation. He is very upbeat and is making progress in therapy. The 

family's goal currently is have him discharged back home, wife is researching 

caregiver support. Palliative care will work with facility and family for smooth

 transition back home. Wife is agreeable to ongoing Palliative Care support.





Recommendations/Counseling Done: 





Parkinson's disease: Stable. On Sinemet, managed by neurologist Dr Johnson in 

[facility], last visit was spring or summer 2108.





General weakness: He is in wheelchair due to LUE fracture, but able to ambulate 

with assist from belted therapist, walking up to 440 steps. PT is ongoing. 

Previous baseline was ambulatory with walker





Fracture, L humeral neck:  Patient denies pain. Uses Norco 5/325 BID routinely 

for chronic back pain. His first ortho appointment is tomorrow, 11/14/18.





HTN with afib: Stable, BP today 112/62. Continue Metoprolol tartrate 25mg BID.





Failure to thrive: Steady weight loss, most recently at 118.8 lbs, though wife 

says he was 112 lbs at one hospital, [facility]?  Records at [facility] indicate he 

was 133 lbs at admission. MedPass 90mL TID for supplement. Dietician is 

following him at [facility].





Chronic back pain: On long-term opioid, Norco 5/325 BID. Also has Tylenol 650mg 

q6h as needed. Denies pain.





Constipation: He is at his baseline of having bowel movements every other day. 

Continue Senna 8.6mg daily and Miralax 17g PRN.





Dysphagia: Followed by CoW dietician. On dysphagia advanced texture and thin 

liquid consistency.





Incontinence:  Off Carter; post-void residual as needed.





Coccyx pressure ulcer:  Clean with NS, pat dry, apply A&D ointment BID.





Advanced care planning: New POLST was signed today: DNR and comfort care, 

previously he elected selective treatment. While at [facility], patient 

has refused cardiac surgery consult for severe mitral regurgitation. Goal of 

both patient and spouse is that he return home, be kept comfortable. Spouse is 

researching caregivers, Palliative Care  provided list of agencies and provided 

information on private caregiving. Palliative Care will work with [facility] 

therapy and  for smooth transition from facility back home. 





Follow up in 2 weeks or as needed.





Time Spent: 





75 minutes were spent with more than 50% of the time spent on counseling, 

education and coordination of care.

## 2018-11-20 ENCOUNTER — HOSPITAL ENCOUNTER (OUTPATIENT)
Dept: HOSPITAL 76 - LAB.R | Age: 83
Discharge: HOME | End: 2018-11-20
Attending: SKILLED NURSING FACILITY
Payer: MEDICARE

## 2018-11-20 DIAGNOSIS — D64.9: ICD-10-CM

## 2018-11-20 DIAGNOSIS — I10: Primary | ICD-10-CM

## 2018-11-20 LAB
ALBUMIN DIAFP-MCNC: 3.2 G/DL (ref 3.2–5.5)
ALBUMIN/GLOB SERPL: 1 {RATIO} (ref 1–2.2)
ALP SERPL-CCNC: 79 IU/L (ref 42–121)
ALT SERPL W P-5'-P-CCNC: 10 IU/L (ref 10–60)
ANION GAP SERPL CALCULATED.4IONS-SCNC: 5 MMOL/L (ref 6–13)
AST SERPL W P-5'-P-CCNC: 16 IU/L (ref 10–42)
BASOPHILS NFR BLD AUTO: 0.1 10^3/UL (ref 0–0.1)
BASOPHILS NFR BLD AUTO: 1.1 %
BILIRUB BLD-MCNC: 0.9 MG/DL (ref 0.2–1)
BUN SERPL-MCNC: 28 MG/DL (ref 6–20)
CALCIUM UR-MCNC: 8.7 MG/DL (ref 8.5–10.3)
CHLORIDE SERPL-SCNC: 103 MMOL/L (ref 101–111)
CO2 SERPL-SCNC: 30 MMOL/L (ref 21–32)
CREAT SERPLBLD-SCNC: 0.9 MG/DL (ref 0.6–1.2)
EOSINOPHIL # BLD AUTO: 0.1 10^3/UL (ref 0–0.7)
EOSINOPHIL NFR BLD AUTO: 1.7 %
ERYTHROCYTE [DISTWIDTH] IN BLOOD BY AUTOMATED COUNT: 14.7 % (ref 12–15)
GFRSERPLBLD MDRD-ARVRAT: 80 ML/MIN/{1.73_M2} (ref 89–?)
GLOBULIN SER-MCNC: 3.2 G/DL (ref 2.1–4.2)
GLUCOSE SERPL-MCNC: 167 MG/DL (ref 70–100)
HGB UR QL STRIP: 11.7 G/DL (ref 14–18)
LYMPHOCYTES # SPEC AUTO: 1 10^3/UL (ref 1.5–3.5)
LYMPHOCYTES NFR BLD AUTO: 14.8 %
MCH RBC QN AUTO: 29.7 PG (ref 27–31)
MCHC RBC AUTO-ENTMCNC: 32.3 G/DL (ref 32–36)
MCV RBC AUTO: 91.9 FL (ref 80–94)
MONOCYTES # BLD AUTO: 0.6 10^3/UL (ref 0–1)
MONOCYTES NFR BLD AUTO: 9.1 %
NEUTROPHILS # BLD AUTO: 5 10^3/UL (ref 1.5–6.6)
NEUTROPHILS # SNV AUTO: 6.8 X10^3/UL (ref 4.8–10.8)
NEUTROPHILS NFR BLD AUTO: 73.3 %
PDW BLD AUTO: 9.1 FL (ref 7.4–11.4)
PLATELET # BLD: 173 10^3/UL (ref 130–450)
PROT SPEC-MCNC: 6.4 G/DL (ref 6.7–8.2)
RBC MAR: 3.93 10^6/UL (ref 4.7–6.1)
SODIUM SERPLBLD-SCNC: 138 MMOL/L (ref 135–145)

## 2018-11-20 PROCEDURE — 80053 COMPREHEN METABOLIC PANEL: CPT

## 2018-11-20 PROCEDURE — 85025 COMPLETE CBC W/AUTO DIFF WBC: CPT

## 2018-12-04 ENCOUNTER — HOSPITAL ENCOUNTER (OUTPATIENT)
Dept: HOSPITAL 76 - PC | Age: 83
Discharge: HOME | End: 2018-12-04
Attending: NURSE PRACTITIONER
Payer: COMMERCIAL

## 2018-12-04 DIAGNOSIS — G89.29: ICD-10-CM

## 2018-12-04 DIAGNOSIS — R53.1: ICD-10-CM

## 2018-12-04 DIAGNOSIS — R62.7: ICD-10-CM

## 2018-12-04 DIAGNOSIS — G20: ICD-10-CM

## 2018-12-04 DIAGNOSIS — Z51.5: Primary | ICD-10-CM

## 2018-12-04 DIAGNOSIS — M54.9: ICD-10-CM

## 2018-12-04 DIAGNOSIS — Z91.81: ICD-10-CM

## 2018-12-04 DIAGNOSIS — K59.00: ICD-10-CM

## 2018-12-04 DIAGNOSIS — Z66: ICD-10-CM

## 2018-12-04 PROCEDURE — 99308 SBSQ NF CARE LOW MDM 20: CPT

## 2018-12-04 NOTE — CONSULTATION NOTE
Palliative Care Follow Up





- Referral


Referring Provider: Dr Paulo Humphries. PCP - Dr Chery


Time of Visit: 12/4/2018.  13:40 - 14:00


Referral setting: Skilled Nursing Facility ([facility])


Referral Reason: Debility / LUE fracture





- Information Sources


Records reviewed: RN notes reviewed, Previous records reviewed


History/Review of Systems obtained from: Patient, Family, Nursing, Other 

(Medical director of SNF)


Exam limitations: No limitations





- History of Present Illness Update


Brief HPI Update: 





85-year-old male with Parkinson's disease and poor balance with history of 

frequent falls, currently at [facility] for rehabilitation of L humeral 

fracture. 





Medical history: Parkinson's disease, initial diagnosis around 2013; HTN; new 

atrial fibrillation; normal LV function, EF 50-55% (10/20/18); severe mitral 

regurgitation; BPH with urinary retention; h/o paraphimosis; dysphagia; h/o CVA 

from patient, ~20 years ago, no functional deficits or residual effects; adult 

FTT, fracture of L humeral neck; h/o frequent falls.





-October 11 he fell fracturing his L humerus and is now at [facility] for 

rehabilitation.


-The plan is to wait for discharge him home until after he can weight bear on 

his LUE. Therapy wants to continue working with him. 


-The 12/13 ortho appointment will hopefully clear him to start weight bearing on

the LUE.


-OT will be evaluating their home once they know his weight bearing status.





-His spouse has arranged for a friend of the family to help out his spouse with 

caregiving duties once he is back home.


-Palliative care will continue to support him and make home visits after he 

discharges home.





-This plan was created at last week's family care conference, and today the 

patient reconfirmed that he agrees with the plan.





-Patient continues to lose weight, in part because he is extremely particular, 

and also does not like the facility food.


-He is now refusing MedPass.





-He has refused certain medications and wants to take only those medications he 

took at home. He was refusing metoprolol until Dr Humphries pointed out that he 

used to take it at home.








Social History





- Living Situation


Living arrangement: Nursing home ([facility])


Living Situation: With caregiver(s)


Support System: 





Patient's spouse, Virginia, visits frequently and will be his main caregiver when he 

discharges home. She is hiring a family friend to help out with caregiving.  

[place] lives [place] the [place] of [place].








Medications/Allergies





- Medications


Home Medications: 


                                Ambulatory Orders











 Medication  Instructions  Recorded  Confirmed


 


Metoprolol Tartrate 25 mg PO BID 04/11/17 12/04/18


 


Acetaminophen 650 mg PO Q6H PRN 11/13/18 12/04/18


 


Carbidopa/Levodopa [Carbidopa-Levo 1 tab PO TID 11/13/18 12/04/18





ER  Tab]   


 


Ferrous Gluconate 240 mg PO DAILY 11/13/18 12/04/18


 


HYDROcod/ACETAM 5/325 [[place] 5/325] 1 tab PO BID PRN 11/13/18 12/04/18


 


House Bowel Program 1 ea PO DAILY PRN 11/13/18 12/04/18


 


Polyethylene Glycol 3350 [Miralax] 17 gm PO DAILY PRN 11/13/18 12/04/18














- Allergies


Allergies/Adverse Reactions: 


                                    Allergies











Allergy/AdvReac Type Severity Reaction Status Date / Time


 


No Known Drug Allergies Allergy   Verified 04/11/17 19:12














Review of Systems





- Constitutional


Constitutional: reports: Poor appetite, Weight loss (114.4 lbs 11/29/18.  123.2 

lbs 10/22/18.  5.7% decrease)





- Ears, Nose & Throat


Ears, Nose & Throat: reports: Hearing loss





- Cardiovascular


Cardiovascular: denies: Chest pain, Edema, Lightheadedness, Exertional dyspnea, 

Decr. exercise tolerance





- Respiratory


Respiratory: denies: SOB at rest, SOB with exertion





- Gastrointestinal


Gastrointestinal: denies: Constipation, Change in bowel habits





- Genitourinary


Genitourinary: denies: Dysuria, Frequency





- Musculoskeletal


Musculoskeletal: reports: Limited range of motion, Muscle weakness, Other (Pain 

in LUE from fracture)





- Neurological


Neurological: reports: Focal weakness (LUE), Pre-existing deficit (Parkinson's, 

balance issues), Abnormal gait, Slurred speech





- Psychiatric


Psychiatric: reports: Anxiety (still wants to leave [facility] asap, but has agreed

 to plan of care to wait at least until weight bearing is ok'd)





- All Other Systems


All Other Systems: reports: Reviewed and negative





Physical Exam





- Vital Signs


Temperature: 96.5 F


Pulse Rate: 77


O2 Saturation: 97 (room air)


Blood Pressure: 131/60 (wrist cuff)





- Physical Exam


General Appearance: positive: No acute distress, Alert


Eyes Bilateral: positive: No lid inflammation, Conjunctivae nml, No scleral 

icterus


ENT: positive: No signs of dehydration


Neck: positive: Trachea midline


Cardiovascular: positive: Regular rate & rhythm, Systolic murmur (3/6)


Respiratory: positive: Chest non-tender, No respiratory distress, Diminished 

throughout


Skin: positive: No symptoms


Extremities: positive: No pedal edema


Neurologic/Psychiatric: positive: Oriented x3, Mood/affect nml, Unintelligible 

speech





Palliative Care





- POLST


Patient has POLST: Yes


POLST Status: DNR, Comfort Measures


Pain: Location (pain in LUE)


Anorexia: Moderate (4-6)


Constipation: No, Managed





- Palliative Care


Discussion: 





His POLST is DNR and comfort care. 


His goal is to get home asap, and he has agreed to wait for the ortho 

appointment 12/13 regarding weight bearing status.  Plan will be revisited then.








Impression and Recommendations





- Palliative Care


Impression: 





85-year-old male with Parkinson's disease and poor balance with history of 

frequent falls, at [facility] for rehabilitation following fracture of L humerus. 

His weight bearing status will be determined at his 12/13/18 orthopedic 

appointment. The plan is to discharge home, and his wife will have a family 

friend assist her with caregiving.  SNF OT will do a home evaluation prior to 

his discharge. Palliative care will continue to provide support and monitoring 

after his discharge home.





Recommendations/Counseling Done: 





Parkinson's disease: Stable, continue Sinemet. Dr Johnson in [facility] manages

 his Parkinson's medications.





General weakness: Improvement. Continue PT. Previous baseline was ambulatory 

with walker.





Fracture, L humeral neck:  Has follow up ortho appointment 12/13/18. Plan is to 

discharge home after he has weight bearing capacity. Tylenol as needed.





HTN with afib: Stable, /60. Continue Metoprolol tartrate 25mg BID.





Failure to thrive: Steady weight loss, now 114.4 lbs, 5.7% decrease since 10/22 

(123 lbs). He was 133 lbs at [facility] admission. He is now refusing MedPass. 

He does not like facility food and is very particular (e.g., the toast is not 

with the eggs, and he rejects the entire meal). Dietician is following him at 

[facility].





Chronic back pain: Norco 5/325 BID prn, he has been on this long-term. It has 

changed from routine to PRN.. Also has Tylenol 650mg q6h as needed. Denies pain.





Constipation: He is at his baseline Continue Miralax 17g PRN. Senna routine was 

DC'd.





Advanced care planning: POLST is DNR and comfort care. Care plan is to DC home 

after he can weight bear on LUE. Spouse will have help for caregiving duties 

from a family friend.





Follow up after discharge home.





Time Spent: 





20 minutes was spent with more than 50% of the time spent on counseling, 

education, and coordination of care.

## 2019-01-08 ENCOUNTER — HOSPITAL ENCOUNTER (OUTPATIENT)
Dept: HOSPITAL 76 - PC | Age: 84
Discharge: HOME | End: 2019-01-08
Attending: NURSE PRACTITIONER
Payer: MEDICARE

## 2019-01-08 DIAGNOSIS — M54.9: ICD-10-CM

## 2019-01-08 DIAGNOSIS — G20: ICD-10-CM

## 2019-01-08 DIAGNOSIS — Z91.81: ICD-10-CM

## 2019-01-08 DIAGNOSIS — Z51.5: Primary | ICD-10-CM

## 2019-01-08 DIAGNOSIS — Z66: ICD-10-CM

## 2019-01-08 DIAGNOSIS — G89.29: ICD-10-CM

## 2019-01-08 DIAGNOSIS — I10: ICD-10-CM

## 2019-01-08 DIAGNOSIS — R62.7: ICD-10-CM

## 2019-01-08 DIAGNOSIS — Z79.891: ICD-10-CM

## 2019-01-08 DIAGNOSIS — Z79.899: ICD-10-CM

## 2019-01-08 DIAGNOSIS — Z87.81: ICD-10-CM

## 2019-01-08 DIAGNOSIS — I48.91: ICD-10-CM

## 2019-01-08 DIAGNOSIS — F02.80: ICD-10-CM

## 2019-01-08 PROCEDURE — 99350 HOME/RES VST EST HIGH MDM 60: CPT

## 2019-01-08 NOTE — CONSULTATION NOTE
Palliative Care Follow Up





- Referral


Referring Provider: Dr Chery


Time of Visit: 1/8/2019.  11:40 - 12:40


Referral setting: Home (Seen in home setting due to taxing and considerable 

effort required to leave the home due to frequent falls. Patient is homebound 

due to weakness and instability secondary to advanced Parkinson's disease.)


Referral Reason: Parkinson's disease/poor balance





- Information Sources


Records reviewed: Previous records reviewed


History/Review of Systems obtained from: Patient, Family


Exam limitations: No limitations





- History of Present Illness Update


Brief HPI Update: 





85-year-old male with Parkinson's disease and poor balance with history of 

frequent falls, October 11th he fell, fractureing his L humerus and was at 

[facility] for 2 months for rehabilitation. He is now back at home, with [facility] 

[facility] PT, OT and bath aid. 





Medical history: Parkinson's disease, initial diagnosis around 2013; HTN; new 

atrial fibrillation; normal LV function, EF 50-55% (10/20/18); severe mitral 

regurgitation; BPH with urinary retention; h/o paraphimosis; dysphagia; h/o CVA 

from patient, ~20 years ago, no functional deficits or residual effects; adult 

FTT, fracture of L humeral neck; h/o frequent falls.





Speaking with spouse on the phone lst week, she reported he had fallen 3 times 

since he was DC'd from [facility].


He cannot get up and transfer by himself but he wasn't asking for helped.





They saw his PCP, Dr Chery, the week of [place] whom, she told me, had told

her she can't handle this alone, she needs caregiver help.





However today at my first visit at their home, it appears the situation has 

significantly improved.





The patient in fact was able to transfer independently from his recliner to 

standing and then walking with his recliner.


He is housebound, and remains upstairs.


His wife remains his sole caregiver, though they have friends she is able to 

call on for help, for instance, if he falls and she can't get him up.





She says that he has not had any further falls since she and I spoke on the 

phone, and in fact it's getting better.


However, he still moves too quickly and his balance is precarious.


But she says that currently it's going well, and she is able to handle it at the

current level.


She does realize and understand that he will continue to decline, and at some 

point she will not be able to handle it alone any longer.


She says that he would call for her to come upstairs, and when she did, he'd be 

asking her to  something from the table that he could have picked up 

himself. 


So she has told him he must do more things on his own, and they have been 

working on that.


He is doing his own ADLs and getting himself ready.


He ambulates to the bathroom.





He is participating in [facility] PT and OT, and they come daily. He 

reports he does the exercises they assign him.


He does report being tired of medical people being in their house every day, and

wishes I would go. He does say it in a pleasant manner, not offensively.





They don't know if he is losing weight, they don't have a scale at home.  He 

weighed 118.8 lbs Nov 13. He was 133.9 lbs on admission to [facility].








Social History





- Living Situation


Living arrangement: At home


Living Situation: With spouse/s.o.


Support System: 





They have one son, who [place] the [place].


They have a few friends they can call on for help, for example, picking him up 

when he falls.








Medications/Allergies





- Medications


Home Medications: 


                                Ambulatory Orders











 Medication  Instructions  Recorded  Confirmed


 


Metoprolol Tartrate 25 mg PO BID 04/11/17 01/08/19


 


Carbidopa/Levodopa [Carbidopa-Levo 1 tab PO TID 11/13/18 01/08/19





ER  Tab]   


 


HYDROcod/ACETAM 5/325 [Benge 5/325] 1 tab PO BID PRN 11/13/18 01/08/19














- Allergies


Allergies/Adverse Reactions: 


                                    Allergies











Allergy/AdvReac Type Severity Reaction Status Date / Time


 


No Known Drug Allergies Allergy   Verified 04/11/17 19:12














Review of Systems





- Constitutional


Constitutional: reports: Weight loss (He doesn't know if he is continuing to 

lose weight. Last weight I have is 114.4 lbs 11/29/18.  He was 133 lbs (60.6kg) 

when admitted to [facility] October 2018.), Other





- Eyes


Eyes: reports: Corrective lenses





- Cardiovascular


Cardiovascular: reports: Decr. exercise tolerance.  denies: Palpitations, Chest 

pain, Edema





- Respiratory


Respiratory: denies: SOB at rest





- Gastrointestinal


Gastrointestinal: reports: Other (He enjoys eating at home, didn't like the food

at [facility]).  denies: Constipation





- Genitourinary


Genitourinary: denies: Incontinence





- Musculoskeletal


Musculoskeletal: reports: Back pain (chronic, on opioids), Assistive devices.  

denies: Transfer issues (self transfers from recliner to standing up/with 

walker)





- Neurological


Neurological: reports: Focal weakness (lower extremities), Pre-existing deficit,

Abnormal gait (Parkinson's shuffle; poor balance)





- Other Findings


Other Findings: 





Limited ROS.








Physical Exam





- Vital Signs


Temperature: 96.8 F


Pulse Rate: 82


O2 Saturation: 98 (room air)


Blood Pressure: 121/67 (wrist cuff)





- Physical Exam


General Appearance: positive: No acute distress, Alert


Eyes Bilateral: positive: EOMI, No lid inflammation, Conjunctivae nml, No 

scleral icterus


ENT: positive: No signs of dehydration


Neck: positive: Trachea midline


Cardiovascular: positive: Irregular, Systolic murmur (4/6)


Respiratory: positive: No respiratory distress, Diminished throughout, Rhonchi 

(RLL)


Abdomen: positive: Non-tender, Soft, Nml bowel sounds


Skin: positive: Pallor


Extremities: positive: Nml appearance, No pedal edema


Neurologic/Psychiatric: positive: Oriented x3, Mood/affect nml





Palliative Care





- POLST


Patient has POLST: Yes


POLST Status: DNR, Comfort Measures


Pain: Pain unchanged, Location (Chronic back pain, uses Norco BID), Comment (No 

other pain apart from his chronic back pain)


Anorexia: Moderate (4-6)


Constipation: Comment (Denies)


Performance Status: 





Does own ADLs


Ambulates with walker


Does not leave the upstairs level; unable to navigate stairs


Continues PT and OT therapy with [facility]








- Palliative Care


Discussion: 





Patient is vociferous and clear that he wants to remain at home, and he wants to

die at home.





On the other hand, his wife Virginia is saying that she would not want him to die at 

home. Her brother had experience with that (in [place], I believe) and he has 

advised her not to do that.  So this subject can be returned to again over time,

it's not a decision or a situation that needs decisions and action currently.





We spent much of the time discussing caregiving and trying to clarify their 

situation.  Virginia was disturbed when Maye, the care coordinator at [facility] had 

told Virginia that Medicaid could come after "her share" of their savings.  





[place] is aghast at the cost of [place], and also of caregiving.

 She wants to know costs. I did clarify that the palliative care SW can help 

with information, resources and pointing her in the right direction, but that 

she will need to do research and information gathering/pricing herself.  Her 

greatest concern is financial and that the patient's half of their savings would

last only about a year.  So at this point, they are staying with their current 

set up of him remaining at home, and Virginia providing the care.  It appears to be 

working so far because he is able to transfer himself, to handle some of his 

ADLs. Though he is limited to only the upstairs, and is dependent on Virginia for 

food, meals, shopping, etc., so far she is handling it.  But she does recognize 

his condition will continue to deteriorate, and the time will come when her 

capacity to handle the care burden will be surpassed. 





They agree to have Palliative Care oversight periodically, and Palliative Care 

ARNP will follow up with them toward the end of February, or they can call 

before then, as needed. They go through his PCP to get any scripts filled, and 

the patient doesn't like having medication staff in his home all the time. 

Palliative Care SW has been referred and will follow up with Virginia.








Impression and Recommendations





- Palliative Care


Impression: 





85-year-old male with Parkinson's disease and history of frequent falls, back at

home now after fracturing his L humerus, and rehab at [facility]. He receives [facility] PT and OT services at home. His wife is his sole caregiver. He remains at 

high risk for falls due to being ambulatory with walker, but with poor balance, 

unsteady gait and impulsiveness. Palliative care will continue to provide 

support and monitoring.





Recommendations/Counseling Done: 





Parkinson's disease: Stable, at baseline, on Sinemet 25/100 TID. Dr Johnson, of 

[facility], manages his Parkinson's medications.





General weakness, poor balance: Improved, able to self transfer, and perform 

ADLs. Continue [facility] PT and OT.





Fracture, L humeral neck: Resolving, able to perform ADLs.  Continued pain with 

LUE elevation, has Benge BID.





HTN with afib: Irregular HR.  BP today 121/67. Continue Metoprolol tartrate 25mg

BID.





Failure to thrive: Unknown if he continues to lose weight; they have no scale at

home. He enjoys food at home more than [facility]'s meals. His ost recent weight 

was 114.4 lbs, 11/29/18. He had been 133 lbs at [facility] admission in Oct 

2018. Will start arm circumference measurements at next visit.





Chronic back pain: Stable, has used Norco 5/325 BID for years. He doesn't use 

Tylenol. Denies pain.





Constipation, opioid-induced: Denies.  Uses no bowel medications; it's 

controlled by diet and natural fiber intake.





Advance care planning: POLST is DNR and comfort care. patient's goal is to 

remain in his home; his wish is to die at home. Spouse is not supportive of 

that, influenced by her brother who had a spouse die in the home with Hospice. 

Will continue the discussion over time. Palliative Care SW will contact spouse 

to provide resource support and evaluation regarding caregiver and alternate 

living arrangements ([facility], etc). They agree to continue to accept Palliative Care

oversight periodically. Palliative Care ARNP will follow up with them toward the

end of February, or they can call before then, as needed. The PCP fulfills his 

scripts, plus the patient doesn't like having medication staff in his home "all 

the time." Palliative Care SW has been referred and will follow up with Virginia for 

resource and informational support.  Virginia will do research into ALFs, and costs 

of other alternative residential options.





Time Spent: 





60 minutes were spent with more than 50% of the time spent on counseling, 

education, anticipatory guidance, and coordination of care.

## 2019-04-05 ENCOUNTER — HOSPITAL ENCOUNTER (OUTPATIENT)
Dept: HOSPITAL 76 - EMS | Age: 84
Discharge: ANOTHER HOSPITAL | End: 2019-04-05
Attending: SURGERY
Payer: MEDICARE

## 2019-04-05 DIAGNOSIS — R53.1: ICD-10-CM

## 2019-04-05 DIAGNOSIS — R63.8: ICD-10-CM

## 2019-04-05 DIAGNOSIS — R06.00: Primary | ICD-10-CM

## 2019-04-07 ENCOUNTER — HOSPITAL ENCOUNTER (OUTPATIENT)
Dept: HOSPITAL 76 - EMS | Age: 84
End: 2019-04-07
Attending: SURGERY
Payer: SELF-PAY